# Patient Record
Sex: MALE | Race: WHITE | URBAN - METROPOLITAN AREA
[De-identification: names, ages, dates, MRNs, and addresses within clinical notes are randomized per-mention and may not be internally consistent; named-entity substitution may affect disease eponyms.]

---

## 2018-08-08 ENCOUNTER — HOSPITAL ENCOUNTER (OUTPATIENT)
Age: 68
Setting detail: SPECIMEN
Discharge: HOME | End: 2018-08-08
Payer: MEDICARE

## 2018-08-08 LAB
C-REACTIVE PROTEIN: 6.6 MG/L (ref 0–5)
SEDIMENTATION RATE, ERYTHROCYTE: 11 MM (ref 0–10)

## 2020-12-30 ENCOUNTER — OFFICE VISIT (OUTPATIENT)
Dept: FAMILY MEDICINE CLINIC | Age: 70
End: 2020-12-30
Payer: MEDICARE

## 2020-12-30 VITALS
WEIGHT: 247 LBS | HEART RATE: 79 BPM | HEIGHT: 70 IN | TEMPERATURE: 97.1 F | BODY MASS INDEX: 35.36 KG/M2 | OXYGEN SATURATION: 99 %

## 2020-12-30 PROCEDURE — G8484 FLU IMMUNIZE NO ADMIN: HCPCS | Performed by: NURSE PRACTITIONER

## 2020-12-30 PROCEDURE — 1123F ACP DISCUSS/DSCN MKR DOCD: CPT | Performed by: NURSE PRACTITIONER

## 2020-12-30 PROCEDURE — 4040F PNEUMOC VAC/ADMIN/RCVD: CPT | Performed by: NURSE PRACTITIONER

## 2020-12-30 PROCEDURE — G8417 CALC BMI ABV UP PARAM F/U: HCPCS | Performed by: NURSE PRACTITIONER

## 2020-12-30 PROCEDURE — 1036F TOBACCO NON-USER: CPT | Performed by: NURSE PRACTITIONER

## 2020-12-30 PROCEDURE — 3017F COLORECTAL CA SCREEN DOC REV: CPT | Performed by: NURSE PRACTITIONER

## 2020-12-30 PROCEDURE — G8427 DOCREV CUR MEDS BY ELIG CLIN: HCPCS | Performed by: NURSE PRACTITIONER

## 2020-12-30 PROCEDURE — 99202 OFFICE O/P NEW SF 15 MIN: CPT | Performed by: NURSE PRACTITIONER

## 2020-12-30 RX ORDER — PREDNISONE 20 MG/1
40 TABLET ORAL DAILY
Qty: 10 TABLET | Refills: 0 | Status: SHIPPED | OUTPATIENT
Start: 2020-12-30 | End: 2021-01-04

## 2020-12-30 RX ORDER — AZELASTINE HYDROCHLORIDE, FLUTICASONE PROPIONATE 137; 50 UG/1; UG/1
2 SPRAY, METERED NASAL DAILY
Qty: 23 G | Refills: 0 | Status: SHIPPED | OUTPATIENT
Start: 2020-12-30

## 2020-12-30 ASSESSMENT — ENCOUNTER SYMPTOMS
DIARRHEA: 0
SORE THROAT: 0
BACK PAIN: 0
VOMITING: 0
SINUS PAIN: 0
NAUSEA: 0
SHORTNESS OF BREATH: 0
ABDOMINAL PAIN: 0
EYE PAIN: 0
COUGH: 0

## 2020-12-30 ASSESSMENT — PATIENT HEALTH QUESTIONNAIRE - PHQ9
SUM OF ALL RESPONSES TO PHQ QUESTIONS 1-9: 0
SUM OF ALL RESPONSES TO PHQ QUESTIONS 1-9: 0
2. FEELING DOWN, DEPRESSED OR HOPELESS: 0
1. LITTLE INTEREST OR PLEASURE IN DOING THINGS: 0
SUM OF ALL RESPONSES TO PHQ QUESTIONS 1-9: 0
SUM OF ALL RESPONSES TO PHQ9 QUESTIONS 1 & 2: 0

## 2020-12-30 NOTE — PROGRESS NOTES
 fluticasone (FLONASE) 50 MCG/ACT nasal spray 1 spray by Nasal route as needed for Rhinitis      naproxen (EC NAPROSYN) 500 MG EC tablet Take 500 mg by mouth 2 times daily (with meals) Last dose 06/30/2015      metoprolol (TOPROL-XL) 50 MG XL tablet Take 50 mg by mouth daily      lisinopril-hydrochlorothiazide (PRINZIDE;ZESTORETIC) 10-12.5 MG per tablet Take 1 tablet by mouth daily.  tamsulosin (FLOMAX) 0.4 MG capsule Take 0.4 mg by mouth daily.  aspirin 81 MG tablet   Take 81 mg by mouth daily Last dose 07/05/2015       No current facility-administered medications for this visit. Facility-Administered Medications Ordered in Other Visits   Medication Dose Route Frequency Provider Last Rate Last Admin    lactated ringers infusion   Intravenous Continuous Flavio Lanza MD   Stopped at 02/03/15 1315    sodium chloride flush 0.9 % injection 10 mL  10 mL Intravenous 2 times per day Flavio Lanza MD        sodium chloride flush 0.9 % injection 10 mL  10 mL Intravenous PRN Flavio Lanza MD        fentaNYL (SUBLIMAZE) injection 25 mcg  25 mcg Intravenous Q5 Min PRN Flavio Lanza MD        midazolam (VERSED) injection 1 mg  1 mg Intravenous Q10 Min PRN Flavio Lanza MD        meperidine (DEMEROL) injection 12.5 mg  12.5 mg Intravenous Q5 Min PRN Flavio Lanza MD        fentaNYL (SUBLIMAZE) injection 25 mcg  25 mcg Intravenous Q5 Min PRN Flavio Lanza MD         Allergies   Allergen Reactions    Xarelto [Rivaroxaban] Hives     hives    Other      [place]    Eyes swell shut- mod reaction    ekg patches red rash  EKG patches:  Causes hives all over torso-mod reaction      Cat Hair Extract Swelling     Allergy to cats       Subjective:      Review of Systems   Constitutional: Negative for chills and fatigue. HENT: Positive for ear pain. Negative for congestion, sinus pain and sore throat. Eyes: Negative for pain and visual disturbance. Respiratory: Negative for cough and shortness of breath. Cardiovascular: Negative for chest pain and palpitations. Gastrointestinal: Negative for abdominal pain, diarrhea, nausea and vomiting. Genitourinary: Negative for penile pain and testicular pain. Musculoskeletal: Negative for back pain, joint swelling and neck pain. Skin: Negative for rash. Neurological: Negative for dizziness and light-headedness. All other systems reviewed and are negative. Objective:     Physical Exam  Vitals signs and nursing note reviewed. Constitutional:       General: He is not in acute distress. Appearance: Normal appearance. He is not toxic-appearing. HENT:      Right Ear: No drainage or swelling. A middle ear effusion is present. Left Ear: Tympanic membrane normal. No drainage or swelling. Nose: Rhinorrhea present. No congestion. Mouth/Throat:      Mouth: Mucous membranes are moist.      Pharynx: No posterior oropharyngeal erythema. Cardiovascular:      Rate and Rhythm: Normal rate. Pulmonary:      Effort: Pulmonary effort is normal. No respiratory distress. Breath sounds: Normal breath sounds. Skin:     General: Skin is warm and dry. Neurological:      General: No focal deficit present. Mental Status: He is alert and oriented to person, place, and time. Pulse 79   Temp 97.1 °F (36.2 °C) (Temporal)   Ht 5' 10\" (1.778 m)   Wt 247 lb (112 kg)   SpO2 99%   BMI 35.44 kg/m²   Lab Review   No visits with results within 2 Month(s) from this visit. Latest known visit with results is:   Hospital Outpatient Visit on 08/08/2018   Component Date Value    CRP 08/08/2018 6.6*    Sed Rate 08/08/2018 11*       Assessment:       Diagnosis Orders   1.  Chronic PERCY (middle ear effusion), right  predniSONE (DELTASONE) 20 MG tablet    KIM - Gina Conley MD, Otolaryngology, [facility]    Azelastine-Fluticasone 881-22 MCG/ACT SUSP       Plan: Return if symptoms worsen or fail to improve. Orders Placed This Encounter   Medications    predniSONE (DELTASONE) 20 MG tablet     Sig: Take 2 tablets by mouth daily for 5 days     Dispense:  10 tablet     Refill:  0    Azelastine-Fluticasone 137-50 MCG/ACT SUSP     Si sprays by Nasal route daily     Dispense:  23 g     Refill:  0     Regarding chronic middle ear effusion recommend prednisone short course, Flonase antihistamine spray. Recommend outpatient follow-up with ENT referral placed  Can continue cefdinir for full duration  Return as needed, follow up with pcp         Patient given educational materials - see patient instructions. Discussed use, benefit, and side effects of prescribed medications. All patientquestions answered. Pt voiced understanding. This note was transcribed using dictation with Dragon services. Efforts were made to correct any errors but some words may be misinterpreted.      Electronically signed by ROBERTO Ramos CNP on 2020at 10:56 AM

## 2020-12-30 NOTE — PATIENT INSTRUCTIONS
Patient Education        Middle Ear Fluid: Care Instructions  Your Care Instructions     Fluid often builds up inside the ear during a cold or allergies. Usually the fluid drains away, but sometimes a small tube in the ear, called the eustachian tube, stays blocked for months. Symptoms of fluid buildup may include:  · Popping, ringing, or a feeling of fullness or pressure in the ear. · Trouble hearing. · Balance problems and dizziness. In most cases, you can treat yourself at home. Follow-up care is a key part of your treatment and safety. Be sure to make and go to all appointments, and call your doctor if you are having problems. It's also a good idea to know your test results and keep a list of the medicines you take. How can you care for yourself at home? · In most cases, the fluid clears up within a few months without treatment. You may need more tests if the fluid does not clear up after 3 months. · If your doctor prescribed antibiotics, take them as directed. Do not stop taking them just because you feel better. You need to take the full course of antibiotics. When should you call for help? Call your doctor now or seek immediate medical care if:    · You have symptoms of infection, such as:  ? Increased pain, swelling, warmth, or redness. ? Pus draining from the area. ? A fever. Watch closely for changes in your health, and be sure to contact your doctor if:    · You notice changes in hearing.     · You do not get better as expected. Where can you learn more? Go to https://[employer]. org and sign in to your [employer] account. Enter A352 in the [facility] box to learn more about \"Middle Ear Fluid: Care Instructions. \"     If you do not have an account, please click on the \"Sign Up Now\" link. Current as of: April 15, 2020               Content Version: 12.6  © 9291-5004 [employer].

## 2021-02-26 ENCOUNTER — OFFICE VISIT (OUTPATIENT)
Dept: PRIMARY CARE CLINIC | Age: 71
End: 2021-02-26
Payer: MEDICARE

## 2021-02-26 VITALS
DIASTOLIC BLOOD PRESSURE: 76 MMHG | RESPIRATION RATE: 18 BRPM | TEMPERATURE: 97.7 F | HEART RATE: 56 BPM | HEIGHT: 70 IN | OXYGEN SATURATION: 98 % | WEIGHT: 250 LBS | BODY MASS INDEX: 35.79 KG/M2 | SYSTOLIC BLOOD PRESSURE: 113 MMHG

## 2021-02-26 DIAGNOSIS — Z96.641 HISTORY OF HIP REPLACEMENT, TOTAL, RIGHT: ICD-10-CM

## 2021-02-26 DIAGNOSIS — M25.551 RIGHT HIP PAIN: Primary | ICD-10-CM

## 2021-02-26 PROCEDURE — 3017F COLORECTAL CA SCREEN DOC REV: CPT | Performed by: NURSE PRACTITIONER

## 2021-02-26 PROCEDURE — G8427 DOCREV CUR MEDS BY ELIG CLIN: HCPCS | Performed by: NURSE PRACTITIONER

## 2021-02-26 PROCEDURE — 1036F TOBACCO NON-USER: CPT | Performed by: NURSE PRACTITIONER

## 2021-02-26 PROCEDURE — 99213 OFFICE O/P EST LOW 20 MIN: CPT | Performed by: NURSE PRACTITIONER

## 2021-02-26 PROCEDURE — G8417 CALC BMI ABV UP PARAM F/U: HCPCS | Performed by: NURSE PRACTITIONER

## 2021-02-26 PROCEDURE — G8484 FLU IMMUNIZE NO ADMIN: HCPCS | Performed by: NURSE PRACTITIONER

## 2021-02-26 PROCEDURE — 4040F PNEUMOC VAC/ADMIN/RCVD: CPT | Performed by: NURSE PRACTITIONER

## 2021-02-26 PROCEDURE — 1123F ACP DISCUSS/DSCN MKR DOCD: CPT | Performed by: NURSE PRACTITIONER

## 2021-02-26 RX ORDER — NITROGLYCERIN 0.4 MG/1
0.4 TABLET SUBLINGUAL PRN
COMMUNITY
Start: 2020-06-24

## 2021-02-26 RX ORDER — HYDROCODONE BITARTRATE AND ACETAMINOPHEN 5; 325 MG/1; MG/1
1 TABLET ORAL EVERY 6 HOURS PRN
Qty: 12 TABLET | Refills: 0 | Status: SHIPPED | OUTPATIENT
Start: 2021-02-26 | End: 2021-03-01

## 2021-02-26 RX ORDER — LISINOPRIL 10 MG/1
TABLET ORAL
COMMUNITY
Start: 2021-01-09

## 2021-02-26 RX ORDER — HYDROCHLOROTHIAZIDE 12.5 MG/1
TABLET ORAL
COMMUNITY
Start: 2021-01-09 | End: 2021-09-09

## 2021-02-26 RX ORDER — TIZANIDINE 4 MG/1
TABLET ORAL
COMMUNITY
Start: 2021-02-18

## 2021-02-26 ASSESSMENT — ENCOUNTER SYMPTOMS
SHORTNESS OF BREATH: 0
DIARRHEA: 0
SORE THROAT: 0
BACK PAIN: 0
NAUSEA: 0
COUGH: 0
VOMITING: 0
EYE PAIN: 0
SINUS PAIN: 0
ABDOMINAL PAIN: 0

## 2021-02-26 ASSESSMENT — PATIENT HEALTH QUESTIONNAIRE - PHQ9
SUM OF ALL RESPONSES TO PHQ QUESTIONS 1-9: 0
1. LITTLE INTEREST OR PLEASURE IN DOING THINGS: 0
2. FEELING DOWN, DEPRESSED OR HOPELESS: 0
SUM OF ALL RESPONSES TO PHQ QUESTIONS 1-9: 0

## 2021-02-26 NOTE — PATIENT INSTRUCTIONS
Patient Education        Hip Pain: Care Instructions  Your Care Instructions     Hip pain may be caused by many things, including overuse, a fall, or a twisting movement. Another cause of hip pain is arthritis. Your pain may increase when you stand up, walk, or squat. The pain may come and go or may be constant. Home treatment can help relieve hip pain, swelling, and stiffness. If your pain is ongoing, you may need more tests and treatment. Follow-up care is a key part of your treatment and safety. Be sure to make and go to all appointments, and call your doctor if you are having problems. It's also a good idea to know your test results and keep a list of the medicines you take. How can you care for yourself at home? · Take pain medicines exactly as directed. ? If the doctor gave you a prescription medicine for pain, take it as prescribed. ? If you are not taking a prescription pain medicine, ask your doctor if you can take an over-the-counter medicine. · Rest and protect your hip. Take a break from any activity, including standing or walking, that may cause pain. · Put ice or a cold pack against your hip for 10 to 20 minutes at a time. Try to do this every 1 to 2 hours for the next 3 days (when you are awake) or until the swelling goes down. Put a thin cloth between the ice and your skin. · Sleep on your healthy side with a pillow between your knees, or sleep on your back with pillows under your knees. · If there is no swelling, you can put moist heat, a heating pad, or a warm cloth on your hip. Do gentle stretching exercises to help keep your hip flexible. · Learn how to prevent falls. Have your vision and hearing checked regularly. Wear slippers or shoes with a nonskid sole. · Stay at a healthy weight. · Wear comfortable shoes. When should you call for help? Call 911 anytime you think you may need emergency care.  For example, call if:   · You have sudden chest pain and shortness of breath, or you cough up blood.     · You are not able to stand or walk or bear weight.     · Your buttocks, legs, or feet feel numb or tingly.     · Your leg or foot is cool or pale or changes color.     · You have severe pain. Call your doctor now or seek immediate medical care if:    · You have signs of infection, such as:  ? Increased pain, swelling, warmth, or redness in the hip area. ? Red streaks leading from the hip area. ? Pus draining from the hip area. ? A fever.     · You have signs of a blood clot, such as:  ? Pain in your calf, back of the knee, thigh, or groin. ? Redness and swelling in your leg or groin.     · You are not able to bend, straighten, or move your leg normally.     · You have trouble urinating or having bowel movements. Watch closely for changes in your health, and be sure to contact your doctor if:    · You do not get better as expected. Where can you learn more? Go to https://[employer]. org and sign in to your [facility] account. Enter P209 in the [employer] box to learn more about \"Hip Pain: Care Instructions. \"     If you do not have an account, please click on the \"Sign Up Now\" link. Current as of: June 26, 2019               Content Version: 12.6  © 5018-2914 [employer]. Care instructions adapted under license by [facility] ([facility]). If you have questions about a medical condition or this instruction, always ask your healthcare professional. [place] [address] any warranty or liability for your use of this information.

## 2021-02-26 NOTE — PROGRESS NOTES
[facility]  [address] [place] [address]  Dept: [phone]  Dept Fax: [phone]    Maria Isabel Bettencourt is a 66 y.o. male who presents to the urgent care today for his medicalconditions/complaints as noted below. Maria Isabel Bettencourt is c/o of Hip Injury (right hip pain s/p fall  hx of replacement 7 years )      HPI:       69-year-old male patient presents with complaint of right hip pain. Patient reports that 2 days prior he was walking down a hill when his dog pulled him and he landed on his right side. Patient reportedly landed on the right shoulder and hip. Patient denies any significant issues with shoulder has normal range of motion and no pain following the fall. Patient reports he has felt pain to the right hip region. Patient reports the pain improves with rest, worsens when he puts pressure on the extremity to the inside aspect. Denies numbness or tingling distally. Patient denies significant range of motion issues. Rates his current pain 7 out of 10. History of hip replacement approximately 7 or 8 years ago per orthopedic surgeon Dr. Tamela Reyes. has tried Tylenol without significant improvement. Denies hitting his head or any loss of consciousness at the time of the fall.       Past Medical History:   Diagnosis Date    Arthritis     Cancer [facility]) 2009    skin cancer on chest and back basal cell, removed    Environmental allergies     Hypertension 2005    ON RX    URI (upper respiratory infection) 05/2014    STARTED ON ATB 05/22/2015, STATES URI IS RESOLVING    Wears glasses         Current Outpatient Medications   Medication Sig Dispense Refill    hydroCHLOROthiazide (HYDRODIURIL) 12.5 MG tablet TAKE ONE TABLET BY MOUTH DAILY      nitroGLYCERIN (NITROSTAT) 0.4 MG SL tablet Place 0.4 mg under the tongue as needed      lisinopril (PRINIVIL;ZESTRIL) 10 MG tablet       tiZANidine (ZANAFLEX) 4 MG tablet Other reaction(s): Eye Swelling       Subjective:      Review of Systems   Constitutional: Negative for chills and fatigue. HENT: Negative for congestion, ear pain, sinus pain and sore throat. Eyes: Negative for pain and visual disturbance. Respiratory: Negative for cough and shortness of breath. Cardiovascular: Negative for chest pain and palpitations. Gastrointestinal: Negative for abdominal pain, diarrhea, nausea and vomiting. Genitourinary: Negative for penile pain and testicular pain. Musculoskeletal: Positive for arthralgias (rt hip). Negative for back pain, joint swelling and neck pain. Skin: Negative for rash. Neurological: Negative for dizziness and light-headedness. Hematological: Does not bruise/bleed easily. All other systems reviewed and are negative. Objective:     Physical Exam  Vitals signs and nursing note reviewed. Constitutional:       General: He is not in acute distress. Appearance: Normal appearance. He is not toxic-appearing. HENT:      Mouth/Throat:      Mouth: Mucous membranes are moist.   Cardiovascular:      Rate and Rhythm: Normal rate. Pulmonary:      Effort: Pulmonary effort is normal. No respiratory distress. Breath sounds: Normal breath sounds. Musculoskeletal:      Right hip: He exhibits tenderness and bony tenderness. He exhibits normal range of motion, no swelling and no deformity. Skin:     General: Skin is warm and dry. Neurological:      General: No focal deficit present. Mental Status: He is alert and oriented to person, place, and time. /76 (Site: Left Upper Arm, Position: Sitting, Cuff Size: Large Adult)   Pulse 56   Temp 97.7 °F (36.5 °C) (Oral)   Resp 18   Ht 5' 10\" (1.778 m)   Wt 250 lb (113.4 kg)   SpO2 98%   BMI 35.87 kg/m²   Lab Review   No visits with results within 2 Month(s) from this visit.    Latest known visit with results is:   Hospital Outpatient Visit on 08/08/2018 Component Date Value    CRP 08/08/2018 6.6*    Sed Rate 08/08/2018 11*       Assessment:       Diagnosis Orders   1. Right hip pain  XR HIP 2-3 VW W PELVIS RIGHT   2. History of hip replacement, total, right  XR HIP 2-3 VW W PELVIS RIGHT       Plan:      No follow-ups on file. No orders of the defined types were placed in this encounter. ONE XRAY VIEW OF THE PELVIS AND TWO XRAY VIEWS RIGHT HIP     2/26/2021 10:24 am     COMPARISON:  July 21, 2015     HISTORY:  ORDERING SYSTEM PROVIDED HISTORY: Right hip pain  TECHNOLOGIST PROVIDED HISTORY:  fall, hx of hip replacement     FINDINGS:  Pelvis: Symphysis pubis interval is normal.  Osseous pelvis is intact. Radiopaque density overlying the left hemipelvis has not significantly  changed since prior exam.  Left hip arthroplasty.     Right hip: Right hip arthroplasty. There is no periprosthetic fracture,  dislocation, or lucency. Alignment is anatomic.     IMPRESSION:  1. Right hip arthroplasty. No radiographic evidence complication. 2. Stable appearance of left hip arthroplasty. Controlled Substance Monitoring:    Acute and Chronic Pain Monitoring:   RX Monitoring 2/26/2021   Periodic Controlled Substance Monitoring No signs of potential drug abuse or diversion identified. Pains of x-ray with the patient. Discussed short-term prescription for Norco to take as needed. Rest, ice. Follow-up with orthopedics as needed if pain persist.         Patient given educational materials - see patient instructions. Discussed use, benefit, and side effects of prescribed medications. All patientquestions answered. Pt voiced understanding. This note was transcribed using dictation with Dragon services. Efforts were made to correct any errors but some words may be misinterpreted.      Electronically signed by ROBERTO Fuentes CNP on 2/26/2021at 10:16 AM

## 2021-09-09 ENCOUNTER — OFFICE VISIT (OUTPATIENT)
Dept: PRIMARY CARE CLINIC | Age: 71
End: 2021-09-09
Payer: MEDICARE

## 2021-09-09 VITALS
SYSTOLIC BLOOD PRESSURE: 137 MMHG | WEIGHT: 236 LBS | TEMPERATURE: 97.3 F | BODY MASS INDEX: 33.79 KG/M2 | HEIGHT: 70 IN | HEART RATE: 85 BPM | OXYGEN SATURATION: 98 % | DIASTOLIC BLOOD PRESSURE: 84 MMHG

## 2021-09-09 DIAGNOSIS — M79.672 LEFT FOOT PAIN: Primary | ICD-10-CM

## 2021-09-09 PROCEDURE — 1123F ACP DISCUSS/DSCN MKR DOCD: CPT | Performed by: NURSE PRACTITIONER

## 2021-09-09 PROCEDURE — 1036F TOBACCO NON-USER: CPT | Performed by: NURSE PRACTITIONER

## 2021-09-09 PROCEDURE — G8417 CALC BMI ABV UP PARAM F/U: HCPCS | Performed by: NURSE PRACTITIONER

## 2021-09-09 PROCEDURE — G8427 DOCREV CUR MEDS BY ELIG CLIN: HCPCS | Performed by: NURSE PRACTITIONER

## 2021-09-09 PROCEDURE — 3017F COLORECTAL CA SCREEN DOC REV: CPT | Performed by: NURSE PRACTITIONER

## 2021-09-09 PROCEDURE — 99213 OFFICE O/P EST LOW 20 MIN: CPT | Performed by: NURSE PRACTITIONER

## 2021-09-09 PROCEDURE — 4040F PNEUMOC VAC/ADMIN/RCVD: CPT | Performed by: NURSE PRACTITIONER

## 2021-09-09 ASSESSMENT — ENCOUNTER SYMPTOMS
WHEEZING: 0
COUGH: 0
SHORTNESS OF BREATH: 0
CHEST TIGHTNESS: 0
RESPIRATORY NEGATIVE: 1
COLOR CHANGE: 0

## 2021-09-09 NOTE — PATIENT INSTRUCTIONS
Patient Education        Foot Sprain: Care Instructions  Your Care Instructions     A foot sprain occurs when you stretch or tear the ligaments around your foot. Ligaments are the tough tissues that connect one bone to another. A sprain can happen when you run, fall, or hit your toe against something. Sprains often happen when you jump or change direction quickly. This may occur when you play basketball, soccer, or other sports. Most foot sprains will get better with treatment at home. Follow-up care is a key part of your treatment and safety. Be sure to make and go to all appointments, and call your doctor if you are having problems. It's also a good idea to know your test results and keep a list of the medicines you take. How can you care for yourself at home? · Walk or put weight on your sprained foot as long as it does not hurt. · If your doctor gave you a splint or immobilizer, wear it as directed. If you were given crutches, use them as directed. · For the first 2 days after your injury, avoid hot showers, hot tubs, or hot packs. They may increase swelling. · Put ice or a cold pack on your foot for 10 to 20 minutes at a time to stop swelling. Try this every 1 to 2 hours for 3 days (when you are awake) or until the swelling goes down. Put a thin cloth between the ice pack and your skin. Keep your splint dry. · After 2 or 3 days, if your swelling is gone, put a heating pad (set on low) or a warm cloth on your foot. Some doctors suggest that you go back and forth between hot and cold treatments. · Prop up your foot on a pillow when you ice it or anytime you sit or lie down. Try to keep it above the level of your heart. This will help reduce swelling. · Take pain medicines exactly as directed. ? If the doctor gave you a prescription medicine for pain, take it as prescribed. ? If you are not taking a prescription pain medicine, ask your doctor if you can take an over-the-counter medicine.   · Do any exercises that your doctor or physical therapist suggests. · Return to your usual exercise gradually as you feel better. When should you call for help? Call your doctor now or seek immediate medical care if:    · You have increased or severe pain.     · Your toes are cool or pale or change color.     · Your wrap or splint feels too tight.     · You have signs of a blood clot, such as:  ? Pain in your calf, back of the knee, thigh, or groin. ? Redness and swelling in your leg or groin.     · You have tingling, weakness, or numbness in your leg or foot. Watch closely for changes in your health, and be sure to contact your doctor if:    · You cannot put any weight on your foot.     · You get a fever.     · You do not get better as expected. Where can you learn more? Go to https://Bandtastic.meedwinaeb.Premier Biomedical. org and sign in to your MD On-Line account. Enter J158 in the [employer] box to learn more about \"Foot Sprain: Care Instructions. \"     If you do not have an account, please click on the \"Sign Up Now\" link. Current as of: November 16, 2020               Content Version: 12.9  © 0999-8106 [employer]. Care instructions adapted under license by [facility] ([facility]). If you have questions about a medical condition or this instruction, always ask your healthcare professional. [address] any warranty or liability for your use of this information. Patient Education        Foot Sprain (Metatarsophalangeal Joint): Rehab Exercises  Introduction  Here are some examples of exercises for you to try. The exercises may be suggested for a condition or for rehabilitation. Start each exercise slowly. Ease off the exercises if you start to have pain. You will be told when to start these exercises and which ones will work best for you. How to do the exercises  Great toe extension stretch   1. Sit in a chair, and put your affected foot across your other knee.   2. Grasp your heel with one hand, and then slowly pull your big toe back with your other hand. Pull your toe back toward your ankle until you feel a stretch along the bottom of your foot. 3. Hold the stretch for at least 15 to 30 seconds. 4. Repeat 2 to 4 times. Great toe flexion stretch   1. Sit in a chair, and put your affected foot across your other knee. 2. Grasp your heel with one hand, and then slowly push your big toe down with your other hand. Push your toe down and away from your ankle until you feel a stretch along the top of your foot. 3. Hold the stretch for at least 15 to 30 seconds. 4. Repeat 2 to 4 times. Great toe traction stretch   1. Sit in a chair, and put your affected foot across your other knee. 2. Grasp your heel and the middle part of your foot with one hand. With your other hand, use your thumb and middle finger to grasp your big toe and gently pull it straight out and away from your foot. 3. Hold the stretch for at least 15 to 30 seconds. 4. Repeat 2 to 4 times. Forefoot stretch   1. Sit in a chair with your affected leg bent, and put the heel of your affected foot on the edge of the seat. Or you can sit on the floor with your leg bent and your heel digging into the floor. 2. Grasp your foot with both hands. Your thumbs will be on the top side of your foot just below the joints where your toes connect to your foot. Your other fingers will be underneath your foot. 3. Use the fingers underneath your foot to push up on the two toes that are closest to your big toe. Then use your thumbs and hands to spread your foot and toes outward until you feel a stretch in your foot. The outer edges of your foot will curve downward as you push up on the middle toes. Hold the stretch for at least 15 to 30 seconds. 4. Next, slowly press your thumbs down on the two toes that are closest to your big toe. The outer edges of your foot will curve upward.  Hold the stretch for at least 15 to 30 seconds until you feel a stretch in your foot. 5. Repeat 2 to 4 times. Towel scrunches   1. Sit in a chair, and place both feet on a towel on the floor. 2. Scrunch the towel toward you with your toes. Then use your toes to push the towel back into place. 3. Repeat 8 to 12 times. [place] pick-ups   1. Put some marbles on the floor next to a cup.  2. Sit in a chair, and use the toes of your affected foot to lift up one marble from the floor at a time. Then try to put the marble in the cup.  3. Repeat 8 to 12 times. Follow-up care is a key part of your treatment and safety. Be sure to make and go to all appointments, and call your doctor if you are having problems. It's also a good idea to know your test results and keep a list of the medicines you take. Where can you learn more? Go to https://Ipracom.Terra Motors. org and sign in to your [email or web address] account. Enter B765 in the [employer] box to learn more about \"Foot Sprain (Metatarsophalangeal Joint): Rehab Exercises. \"     If you do not have an account, please click on the \"Sign Up Now\" link. Current as of: November 16, 2020               Content Version: 12.9  © 2006-2021 Healthwise, Incorporated. Care instructions adapted under license by [facility] ([facility]). If you have questions about a medical condition or this instruction, always ask your healthcare professional. [place] [address] any warranty or liability for your use of this information. Patient Education        Learning About RICE (Rest, Ice, Compression, and Elevation)  What is RICE? RICE is a way to care for an injury. RICE helps relieve pain and swelling. It may also help with healing and flexibility. RICE stands for:  · R est and protect the injured or sore area. · I ce or a cold pack used as soon as possible. · C ompression, or wrapping the injured or sore area with an elastic bandage. · E levation (propping up) the injured or sore area.   How do you do RICE?  You can use RICE for home treatment when you have general aches and pains or after an injury or surgery. Rest  · Do not put weight on the injury for at least 24 to 48 hours. · Use crutches for a badly sprained knee or ankle. · Support a sprained wrist, elbow, or shoulder with a sling. Ice  · Put ice or a cold pack on the injury right away to reduce pain and swelling. Frozen vegetables will also work as an ice pack. Put a thin cloth between the ice or cold pack and your skin. The cloth protects the injured area from getting too cold. · Use ice for 10 to 15 minutes at a time for the first 48 to 72 hours. Compression  · Use compression for sprains, strains, and surgeries of the arms and legs. · Wrap the injured area with an elastic bandage or compression sleeve to reduce swelling. · Don't wrap it too tightly. If the area below it feels numb, tingles, or feels cool, loosen the wrap. Elevation  · Use elevation for areas of the body that can be propped up, such as arms and legs. · Prop up the injured area on pillows whenever you use ice. Keep it propped up anytime you sit or lie down. · Try to keep the injured area at or above the level of your heart. This will help reduce swelling and bruising. Where can you learn more? Go to https://Starfish Retention SolutionsedwinaForuforever.SellrBuyr [employer]. org and sign in to your Beatsy account. Enter P230 in the [facility] box to learn more about \"Learning About RICE (Rest, Ice, Compression, and Elevation). \"     If you do not have an account, please click on the \"Sign Up Now\" link. Current as of: November 16, 2020               Content Version: 12.9  © 2040-5857 Healthwise, [employer]. Care instructions adapted under license by [facility] ([facility]). If you have questions about a medical condition or this instruction, always ask your healthcare professional. [place] [address] any warranty or liability for your use of this information.

## 2021-09-09 NOTE — PROGRESS NOTES
[facility] [address] [facility] WALK IN CARE  [address] [place] [address]  Dept: [phone]  Dept Fax: [phone]     Deniz Teran is a 66 y.o. male who presents to the urgent care today for his medicalconditions/complaints as noted below. Deniz Teran is c/o of Foot Pain (left foot pain x 2 days. pt can't put weight on it )    HPI:      Foot Pain   The pain is present in the left foot. This is a new problem. The current episode started in the past 7 days. There has been no history of extremity trauma. The problem has been unchanged. The pain is moderate. Associated symptoms include joint swelling (left foot swelling). Pertinent negatives include no fever, limited range of motion, numbness or tingling. Treatments tried: ACE bandage, ice. The treatment provided no relief. Past Medical History:   Diagnosis Date    Arthritis     Cancer [facility]) 2009    skin cancer on chest and back basal cell, removed    Environmental allergies     Hypertension 2005    ON RX    URI (upper respiratory infection) 05/2014    STARTED ON ATB 05/22/2015, STATES URI IS RESOLVING    Wears glasses       Current Outpatient Medications   Medication Sig Dispense Refill    diclofenac sodium (VOLTAREN) 1 % GEL Apply 4 g topically 2 times daily 150 g 1    lisinopril (PRINIVIL;ZESTRIL) 10 MG tablet       nitroGLYCERIN (NITROSTAT) 0.4 MG SL tablet Place 0.4 mg under the tongue as needed      tiZANidine (ZANAFLEX) 4 MG tablet       Azelastine-Fluticasone 137-50 MCG/ACT SUSP 2 sprays by Nasal route daily 23 g 0    guaiFENesin 400 MG tablet Take 400 mg by mouth 4 times daily as needed for Cough      fluticasone (FLONASE) 50 MCG/ACT nasal spray 1 spray by Nasal route as needed for Rhinitis      metoprolol (TOPROL-XL) 50 MG XL tablet Take 50 mg by mouth daily      lisinopril-hydrochlorothiazide (PRINZIDE;ZESTORETIC) 10-12.5 MG per tablet Take 1 tablet by mouth daily.       tamsulosin (FLOMAX) 0.4 MG capsule Take 0.4 mg by mouth daily.  aspirin 81 MG tablet   Take 81 mg by mouth daily Last dose 07/05/2015       No current facility-administered medications for this visit. Facility-Administered Medications Ordered in Other Visits   Medication Dose Route Frequency Provider Last Rate Last Admin    lactated ringers infusion   IntraVENous Continuous Flavio Lanza MD   Stopped at 02/03/15 1315    sodium chloride flush 0.9 % injection 10 mL  10 mL IntraVENous 2 times per day Flavio Lanza MD        sodium chloride flush 0.9 % injection 10 mL  10 mL IntraVENous PRN Flavio Lanza MD        fentaNYL (SUBLIMAZE) injection 25 mcg  25 mcg IntraVENous Q5 Min PRN Flavio Lanza MD        midazolam (VERSED) injection 1 mg  1 mg IntraVENous Q10 Min PRN Flavio Lanza MD        meperidine (DEMEROL) injection 12.5 mg  12.5 mg IntraVENous Q5 Min PRN Flavio Lanza MD        fentaNYL (SUBLIMAZE) injection 25 mcg  25 mcg IntraVENous Q5 Min PRN Flavio Lanza MD         Allergies   Allergen Reactions    Latex Rash     Other reaction(s): Rash-Severe    Xarelto [Rivaroxaban] Hives     hives    Adhesive Tape Rash     EKG patches    Other      [place]    Eyes swell shut- mod reaction    ekg patches red rash  EKG patches:  Causes hives all over torso-mod reaction      Cat Hair Extract Swelling     Allergy to cats    Cottonseed Oil      Allergy to [place], eyes swell    Oxycodone-Acetaminophen Rash    Pollen Extract      Other reaction(s): Eye Swelling     Reviewed PMH, SH, and  with the patient and updated. Subjective:      Review of Systems   Constitutional: Negative for chills, fatigue and fever. Respiratory: Negative. Negative for cough, chest tightness, shortness of breath and wheezing. Cardiovascular: Negative. Negative for chest pain. Musculoskeletal: Positive for arthralgias (left foot), gait problem (due to left foot pain) and joint swelling (left foot).    Skin: Negative for color change and rash. Neurological: Negative for tingling and numbness. All other systems reviewed and are negative. Objective:      Physical Exam  Vitals and nursing note reviewed. Constitutional:       General: He is not in acute distress. Appearance: He is well-developed. He is not diaphoretic. HENT:      Head: Normocephalic and atraumatic. Cardiovascular:      Rate and Rhythm: Normal rate and regular rhythm. Heart sounds: Normal heart sounds. No murmur heard. Pulmonary:      Effort: Pulmonary effort is normal. No respiratory distress. Breath sounds: Normal breath sounds. No wheezing. Musculoskeletal:      Left lower leg: Normal.      Left ankle: Normal.      Left foot: Normal range of motion. Swelling (1+ edema), tenderness (noted over the lateral aspect of the left foot) and bony tenderness (noted over the lateral aspect of the left foot) present. No bunion. Skin:     General: Skin is warm and dry. Neurological:      Mental Status: He is alert. /84 (Site: Right Upper Arm, Position: Sitting, Cuff Size: Large Adult)   Pulse 85   Temp 97.3 °F (36.3 °C) (Temporal)   Ht 5' 10\" (1.778 m)   Wt 236 lb (107 kg)   SpO2 98%   BMI 33.86 kg/m²     Narrative   EXAMINATION:   THREE XRAY VIEWS OF THE LEFT FOOT       9/9/2021 9:30 am       COMPARISON:   None.       HISTORY:   ORDERING SYSTEM PROVIDED HISTORY: Left foot pain   TECHNOLOGIST PROVIDED HISTORY:   left lateral foot pain   Reason for Exam: lt lateral foot pain   Acuity: Unknown   Type of Exam: Initial       69-year-old male with left lateral foot pain       FINDINGS:   Osseous alignment is normal.  Joint spaces are well maintained.  No marginal   erosions are identified.  No acute fracture or gross dislocation is seen.    Mild plantar calcaneal spur.  Kati's deformity.       The medial and middle cuneiforms demonstrate proper alignment with the base   of the 1st and 2nd metatarsals respectively.       No tibiotalar joint effusion is seen.       Boehler's angle is maintained.           Impression   1. No acute fracture or dislocation. 2. Mild plantar calcaneal spur.  Kati's deformity. Assessment:       Diagnosis Orders   1. Left foot pain  XR FOOT LEFT (MIN 3 VIEWS)    diclofenac sodium (VOLTAREN) 1 % GEL     Plan:      XR results as noted above. Ice, compression, and elevation recommended at this time. ACE bandage applied in the office. I also recommend working on some home stretches, which were provided on the AVS.  Voltaren gel to be applied topically 2-3 x/day for the inflammation/pain. Patient agreeable to treatment plan. Follow up if symptoms do not improve. Would likely re-xray and refer to podiatry. Orders Placed This Encounter   Medications    diclofenac sodium (VOLTAREN) 1 % GEL     Sig: Apply 4 g topically 2 times daily     Dispense:  150 g     Refill:  1        Patient given educational materials - see patient instructions. Discussed use, benefit, and side effects of prescribed medications. All patientquestions answered. Pt voiced understanding.     Electronically signed by ROBERTO Albert CNP on 9/9/2021at 10:10 AM

## 2023-03-22 ENCOUNTER — OFFICE VISIT (OUTPATIENT)
Dept: PRIMARY CARE CLINIC | Age: 73
End: 2023-03-22
Payer: MEDICARE

## 2023-03-22 VITALS
OXYGEN SATURATION: 96 % | SYSTOLIC BLOOD PRESSURE: 147 MMHG | TEMPERATURE: 98 F | DIASTOLIC BLOOD PRESSURE: 70 MMHG | HEART RATE: 67 BPM

## 2023-03-22 DIAGNOSIS — J01.00 ACUTE NON-RECURRENT MAXILLARY SINUSITIS: Primary | ICD-10-CM

## 2023-03-22 PROCEDURE — G8427 DOCREV CUR MEDS BY ELIG CLIN: HCPCS

## 2023-03-22 PROCEDURE — 99213 OFFICE O/P EST LOW 20 MIN: CPT

## 2023-03-22 PROCEDURE — 1036F TOBACCO NON-USER: CPT

## 2023-03-22 PROCEDURE — 1123F ACP DISCUSS/DSCN MKR DOCD: CPT

## 2023-03-22 PROCEDURE — G8484 FLU IMMUNIZE NO ADMIN: HCPCS

## 2023-03-22 PROCEDURE — 3017F COLORECTAL CA SCREEN DOC REV: CPT

## 2023-03-22 PROCEDURE — G8421 BMI NOT CALCULATED: HCPCS

## 2023-03-22 RX ORDER — AMOXICILLIN AND CLAVULANATE POTASSIUM 875; 125 MG/1; MG/1
1 TABLET, FILM COATED ORAL 2 TIMES DAILY
Qty: 14 TABLET | Refills: 0 | Status: SHIPPED | OUTPATIENT
Start: 2023-03-22 | End: 2023-03-29

## 2023-03-22 ASSESSMENT — ENCOUNTER SYMPTOMS
DIARRHEA: 0
BLOOD IN STOOL: 0
CHANGE IN BOWEL HABIT: 0
TROUBLE SWALLOWING: 0
ABDOMINAL DISTENTION: 0
ABDOMINAL PAIN: 0
WHEEZING: 0
VOMITING: 0
PHOTOPHOBIA: 0
FACIAL SWELLING: 0
SINUS PAIN: 0
EYE PAIN: 0
SINUS PRESSURE: 0
EYE ITCHING: 0
COUGH: 0
SHORTNESS OF BREATH: 0
VOICE CHANGE: 0
VISUAL CHANGE: 0
APNEA: 0
EYES NEGATIVE: 1
EYE REDNESS: 0
ANAL BLEEDING: 0
RHINORRHEA: 0
NAUSEA: 0
RESPIRATORY NEGATIVE: 1
CHEST TIGHTNESS: 0
SORE THROAT: 0
SWOLLEN GLANDS: 0
STRIDOR: 0
EYE DISCHARGE: 0
BACK PAIN: 0
CONSTIPATION: 0
CHOKING: 0
GASTROINTESTINAL NEGATIVE: 1
RECTAL PAIN: 0
COLOR CHANGE: 0

## 2023-03-22 NOTE — PROGRESS NOTES
[address] WALK IN CARE  [facility] [address] [facility] WALK IN CARE  [address] [place] [address]  Dept: [phone]    Josh Michaels is a 75 y.o. male Established patient, who presents to the walk-in clinic today with conditions/complaints as noted below:    Chief Complaint   Patient presents with    Positive For Covid-19     Sx started last Tuesday test this morning is still positive         HPI:     Positive For Covid-19  This is a new problem. The current episode started in the past 7 days. The problem occurs constantly. The problem has been gradually worsening. Associated symptoms include congestion and fatigue. Pertinent negatives include no abdominal pain, anorexia, arthralgias, change in bowel habit, chest pain, chills, coughing, diaphoresis, fever, headaches, joint swelling, myalgias, nausea, neck pain, numbness, rash, sore throat, swollen glands, urinary symptoms, vertigo, visual change, vomiting or weakness. Treatments tried: Benadryl, Tylenol. The treatment provided no relief.      Past Medical History:   Diagnosis Date    Arthritis     Cancer ([facility] [address] 2009    skin cancer on chest and back basal cell, removed    Environmental allergies     Hypertension 2005    ON RX    URI (upper respiratory infection) 05/2014    STARTED ON ATB 05/22/2015, STATES URI IS RESOLVING    Wears glasses        Current Outpatient Medications   Medication Sig Dispense Refill    amoxicillin-clavulanate (AUGMENTIN) 875-125 MG per tablet Take 1 tablet by mouth 2 times daily for 7 days 14 tablet 0    lisinopril (PRINIVIL;ZESTRIL) 10 MG tablet       nitroGLYCERIN (NITROSTAT) 0.4 MG SL tablet Place 0.4 mg under the tongue as needed      Azelastine-Fluticasone 137-50 MCG/ACT SUSP 2 sprays by Nasal route daily 23 g 0    fluticasone (FLONASE) 50 MCG/ACT nasal spray 1

## 2025-07-22 ENCOUNTER — OFFICE VISIT (OUTPATIENT)
Age: 75
End: 2025-07-22

## 2025-07-22 VITALS
HEART RATE: 71 BPM | SYSTOLIC BLOOD PRESSURE: 160 MMHG | OXYGEN SATURATION: 93 % | RESPIRATION RATE: 18 BRPM | TEMPERATURE: 97.8 F | BODY MASS INDEX: 35.79 KG/M2 | HEIGHT: 70 IN | DIASTOLIC BLOOD PRESSURE: 88 MMHG | WEIGHT: 250 LBS

## 2025-07-22 DIAGNOSIS — I10 PRIMARY HYPERTENSION: ICD-10-CM

## 2025-07-22 DIAGNOSIS — J40 BRONCHITIS: Primary | ICD-10-CM

## 2025-07-22 DIAGNOSIS — J06.9 VIRAL URI: ICD-10-CM

## 2025-07-22 RX ORDER — METHYLPREDNISOLONE 4 MG/1
4 TABLET ORAL SEE ADMIN INSTRUCTIONS
Qty: 1 KIT | Refills: 0 | Status: SHIPPED | OUTPATIENT
Start: 2025-07-22

## 2025-07-22 RX ORDER — LORATADINE 10 MG/1
10 TABLET ORAL DAILY
Qty: 30 TABLET | Refills: 0 | Status: SHIPPED | OUTPATIENT
Start: 2025-07-22

## 2025-07-22 ASSESSMENT — ENCOUNTER SYMPTOMS
SHORTNESS OF BREATH: 1
GASTROINTESTINAL NEGATIVE: 1
EYES NEGATIVE: 1
VOMITING: 0
COUGH: 1
WHEEZING: 0
SWOLLEN GLANDS: 0
SORE THROAT: 1
ALLERGIC/IMMUNOLOGIC NEGATIVE: 1
ABDOMINAL PAIN: 0
SINUS PAIN: 0
RHINORRHEA: 1

## 2025-07-22 NOTE — PROGRESS NOTES
Negative for abdominal pain and vomiting.   Endocrine: Negative.    Genitourinary: Negative.    Musculoskeletal: Negative.  Negative for neck pain.   Skin: Negative.    Allergic/Immunologic: Negative.    Neurological: Negative.    Hematological: Negative.    Psychiatric/Behavioral: Negative.           Assessment and Plan     No results found for this visit on 07/22/25.    Physical Exam  Vitals and nursing note reviewed.   Constitutional:       Appearance: Normal appearance.   HENT:      Head: Normocephalic.      Right Ear: External ear normal.      Left Ear: External ear normal.      Nose: Congestion present.      Right Turbinates: Swollen.      Left Turbinates: Swollen.      Mouth/Throat:      Mouth: Mucous membranes are moist.   Eyes:      Pupils: Pupils are equal, round, and reactive to light.   Cardiovascular:      Rate and Rhythm: Normal rate and regular rhythm.      Pulses: Normal pulses.      Heart sounds: Normal heart sounds.   Pulmonary:      Effort: Pulmonary effort is normal.      Breath sounds: Decreased breath sounds present.   Abdominal:      General: Bowel sounds are normal.   Musculoskeletal:         General: Normal range of motion.      Cervical back: Normal range of motion.   Skin:     General: Skin is warm and dry.   Neurological:      General: No focal deficit present.      Mental Status: He is alert.   Psychiatric:         Mood and Affect: Mood normal.         Behavior: Behavior normal.          Diagnosis Orders   1. Bronchitis  methylPREDNISolone (MEDROL DOSEPACK) 4 MG tablet      2. Primary hypertension  Malena Kunz MD, [facility]      3. Viral URI            The patient presents with symptoms and clinic findings consistent with viral URI/bronchitis .The patient had normal work of breathing. Normal level of alertness.  Well hydrated. No meningeal signs. Lung sounds diminished. O2sa decreasedt. No signs of sepsis.  ER precautions given to include persistent high fever,